# Patient Record
Sex: FEMALE | Race: WHITE | Employment: PART TIME | URBAN - METROPOLITAN AREA
[De-identification: names, ages, dates, MRNs, and addresses within clinical notes are randomized per-mention and may not be internally consistent; named-entity substitution may affect disease eponyms.]

---

## 2017-03-15 ENCOUNTER — TELEPHONE (OUTPATIENT)
Dept: NEUROLOGY | Age: 62
End: 2017-03-15

## 2017-03-15 NOTE — TELEPHONE ENCOUNTER
----- Message from Brenda Govea sent at 3/14/2017  1:52 PM EDT -----  Regarding: Dr. Cb Leach called concerning the information for the MRI she has to have done.  PT NUMBER [phone]

## 2017-03-16 DIAGNOSIS — M54.10 RADICULAR SYNDROME OF LEFT LEG: Primary | ICD-10-CM

## 2017-03-16 NOTE — TELEPHONE ENCOUNTER
Spoke with patient and made her aware of MRI order and advised her to schedule f/u for 2 weeks after to go over results. Patient voiced understanding and will call back if any further questions or concerns.

## 2017-03-16 NOTE — TELEPHONE ENCOUNTER
Contacted patient. She states at her last appt there was discussion about doing an MRI, however there is not an order entered. She is wanting an MRI now. Please advise.

## 2017-04-12 ENCOUNTER — TELEPHONE (OUTPATIENT)
Dept: NEUROLOGY | Age: 62
End: 2017-04-12

## 2017-04-12 NOTE — TELEPHONE ENCOUNTER
----- Message from [facility] sent at 4/12/2017 11:31 AM EDT -----  Regarding: Dr. Lars Resendiz  Pt requesting a call back, in regards to be scheduled for a MRI. Pt best contact [phone], cell [phone]. Pt has requested this appt 4 weeks.

## 2017-04-18 ENCOUNTER — HOSPITAL ENCOUNTER (OUTPATIENT)
Dept: MRI IMAGING | Age: 62
Discharge: HOME | End: 2017-04-18
Attending: PSYCHIATRY & NEUROLOGY
Payer: COMMERCIAL

## 2017-04-18 DIAGNOSIS — M54.10 RADICULAR SYNDROME OF LEFT LEG: ICD-10-CM

## 2017-04-18 PROCEDURE — 72148 MRI LUMBAR SPINE W/O DYE: CPT

## 2017-05-26 ENCOUNTER — OFFICE VISIT (OUTPATIENT)
Dept: NEUROLOGY | Age: 62
End: 2017-05-26

## 2017-05-26 VITALS
SYSTOLIC BLOOD PRESSURE: 118 MMHG | HEART RATE: 88 BPM | HEIGHT: 63 IN | BODY MASS INDEX: 26.93 KG/M2 | OXYGEN SATURATION: 98 % | DIASTOLIC BLOOD PRESSURE: 78 MMHG | WEIGHT: 152 LBS

## 2017-05-26 DIAGNOSIS — R20.2 PARESTHESIA OF LEFT LEG: Primary | ICD-10-CM

## 2017-05-26 DIAGNOSIS — M54.10 RADICULAR SYNDROME OF LEFT LEG: ICD-10-CM

## 2017-05-26 PROBLEM — M79.605 LEFT LEG PAIN: Status: ACTIVE | Noted: 2017-05-26

## 2017-05-26 PROBLEM — M79.605 LEFT LEG PAIN: Status: RESOLVED | Noted: 2017-05-26 | Resolved: 2017-05-26

## 2017-05-26 NOTE — PROGRESS NOTES
Interval HPI:   This is a 63 y.o. female who is following up for     Chief Complaint   Patient presents with    Results     Interval Hx:  Follow up to review MRI L-spine results. MRI L-spine shows mild facet arthropathy at L4-5 and L5-S1, very mild disc dessication at L4-5 but other levels normal, no spinal canal or foraminal stenosis. Reviewed EMG left leg done in . Mild chronic neurogenic changes in left S1 > L5 muscles and CRD x 1 in left gluteus steve, consistent with mild, chronic left S1 radiculopathy. Pt says prior left leg pain has largely resolved, only happening occasionally now. Pattern is a little different from what was described in past.  Describes episodes where she'll get sharp pain in left anterior-lateral thigh, nothing in calf, then a separate burning sensation on the top of her left foot and in her heel. Does report that she's gained about 10 lbs over the past year and clothing feels tight across waist. Never tried the Gabapentin due to fear of side effects, and not having much pain now.    ======================  Brief Hx:  63 y.o. female with complaint of left leg sciatic pain for a few few years, worsening starting in early 2016. Described it as burning on top of left foot, left heel, calf, and up back of thigh ending around buttock, intermittently throughout the day. No frequent or severe back pain. Rarely has similar burning pain on top of right foot. Tried voltaren gel, didn't help. Had left leg EMG which showed a mild left S1 radiculopathy. Brief ROS: as above or otherwise negative  There have been no significant changes in PMHx, PSHx, SHx except as noted above. Allergies   Allergen Reactions    Meloxicam Swelling     Swelling of throat and tongue     Current Outpatient Prescriptions   Medication Sig Dispense Refill    gabapentin (NEURONTIN) 300 mg capsule Take 1 Cap by mouth nightly as needed.  Left leg pain 30 Cap 1       Physical Exam  Blood pressure 118/78, pulse 88, height 5' 2.5\" (1.588 m), weight 68.9 kg (152 lb), SpO2 98 %. No acute distress  Skin: no rashes    Focused Neurological Exam     Mental status: Alert and oriented to person, place situation. Language: normal fluency and comprehension; no dysarthria. CNs:   Visual fields grossly normal  Extraocular movements intact, no nystagmus  Face appears symmetric and facial strength normal.    Hearing is intact to casual conversation. Sensory: intact LT across both anterior and lateral thighs (no left side asymmetry)  Motor: Normal bulk and strength in all 4 extremities. Reflexes: DTRs are symmetric, 2+ patellars  Gait: not observed    Impression      ICD-10-CM ICD-9-CM    1. Paresthesia of left leg R20.2 782.0    2. Radicular syndrome of left leg M54.10 724.4        D/w patient that MRI doesn't show any cause of left leg radicular symptoms (i.e no disc herniation, no spinal or foraminal stenosis). She was relieved to hear that. EMG does show changes consistent with a mild chronic S1 radiculopathy as discussed above, though she doesn't seem to be having pain in that dermatomal distribution. D/w her that in light of her gradual weight gain, the left anterior-lateral thigh pain may be due to meralgia paresthetica (lateral femoral cutaneous nerve impingement) across the inguinal ligament due to the weight gain or wearing tight-fitting clothing. Encouraged her ongoing effort at weight loss and to avoid wearing tight fitting clothing across her waist.  No active neurologic issues so pt will follow up prn.

## 2017-05-26 NOTE — MR AVS SNAPSHOT
Visit Information Date & Time Provider Department Dept. Phone Encounter #  
 5/26/2017  1:20 PM Osmar Amador MD [facility] [phone] [id number] Follow-up Instructions Return if symptoms worsen or fail to improve. Upcoming Health Maintenance Date Due Hepatitis C Screening 1955 DTaP/Tdap/Td series (1 - Tdap) 1/17/1978 PAP AKA CERVICAL CYTOLOGY 1/17/1978 BREAST CANCER SCRN MAMMOGRAM 1/17/2007 FOBT Q 1 YEAR AGE 50-75 1/17/2007 ZOSTER VACCINE AGE 60> 1/17/2017 INFLUENZA AGE 9 TO ADULT 8/1/2017 Allergies as of 5/26/2017  Review Complete On: 5/26/2017 By: Gita Oh LPN Severity Noted Reaction Type Reactions Meloxicam High 07/25/2016    Swelling Swelling of throat and tongue Current Immunizations  Never Reviewed No immunizations on file. Not reviewed this visit You Were Diagnosed With   
  
 Codes Comments Left leg pain    -  Primary ICD-10-CM: M79.605 ICD-9-CM: 729.5 Vitals BP Pulse Height(growth percentile) Weight(growth percentile) SpO2 BMI  
 118/78 (BP 1 Location: Left arm, BP Patient Position: Sitting) 88 5' 2.5\" (1.588 m) 152 lb (68.9 kg) 98% 27.36 kg/m2 OB Status Smoking Status Postmenopausal Never Smoker BMI and BSA Data Body Mass Index Body Surface Area  
 27.36 kg/m 2 1.74 m 2 Preferred Pharmacy Pharmacy Name Phone [facility] [address] [place] [address] [address] AT  [facility] [phone] Your Updated Medication List  
  
   
This list is accurate as of: 5/26/17  1:39 PM.  Always use your most recent med list.  
  
  
  
  
 gabapentin 300 mg capsule Commonly known as:  NEURONTIN Take 1 Cap by mouth nightly as needed. Left leg pain Follow-up Instructions Return if symptoms worsen or fail to improve. Please provide this summary of care documentation to your next provider. Your primary care clinician is listed as Rony Floyd. If you have any questions after today's visit, please call [phone].